# Patient Record
Sex: MALE | Race: WHITE | NOT HISPANIC OR LATINO
[De-identification: names, ages, dates, MRNs, and addresses within clinical notes are randomized per-mention and may not be internally consistent; named-entity substitution may affect disease eponyms.]

---

## 2021-05-04 PROBLEM — Z00.00 ENCOUNTER FOR PREVENTIVE HEALTH EXAMINATION: Status: ACTIVE | Noted: 2021-05-04

## 2021-05-07 ENCOUNTER — APPOINTMENT (OUTPATIENT)
Dept: NEPHROLOGY | Facility: CLINIC | Age: 81
End: 2021-05-07
Payer: MEDICARE

## 2021-05-07 VITALS
DIASTOLIC BLOOD PRESSURE: 70 MMHG | HEART RATE: 93 BPM | BODY MASS INDEX: 23.43 KG/M2 | HEIGHT: 72 IN | SYSTOLIC BLOOD PRESSURE: 114 MMHG | WEIGHT: 173 LBS | RESPIRATION RATE: 12 BRPM | OXYGEN SATURATION: 94 %

## 2021-05-07 PROCEDURE — 99205 OFFICE O/P NEW HI 60 MIN: CPT

## 2021-05-07 NOTE — PHYSICAL EXAM
[General Appearance - Alert] : alert [General Appearance - In No Acute Distress] : in no acute distress [PERRL With Normal Accommodation] : pupils were equal in size, round, and reactive to light [Hearing Threshold Finger Rub Not [place]] : hearing was normal [Auscultation Breath Sounds / Voice Sounds] : lungs were clear to auscultation bilaterally [FreeTextEntry1] : b/l chronic stasis dermatitis, LE edema [Abdomen Soft] : soft [Abdomen Tenderness] : non-tender [No CVA Tenderness] : no ~M costovertebral angle tenderness [] : no rash [No Focal Deficits] : no focal deficits [Affect] : the affect was normal [Mood] : the mood was normal

## 2021-05-07 NOTE — HISTORY OF PRESENT ILLNESS
[FreeTextEntry1] : 80 year old man with PMH of COPD, ex-smoker, stage IV lung cancer diagnosed 5 years ago, s/p chemotherapy and immunotherapy(last cycle in June 2021)- follows with Dr. Heart\par Prostate Ca diagnosed in 2005, s/p radioactive seed placement, s/p brachytherapy ablation in 2015 and now on leupron injections\par Pt reports he was sent by Dr Heart for Creatinine of 1.8\par \par Pt seen and examined\par Feels well\par No acute complaint\par \par Takes Ibuprofen once every few weeks\par \par

## 2021-05-07 NOTE — ASSESSMENT
[FreeTextEntry1] : Marcio vs Marcio on ckd\par Lung Ca stage IV\par prostate Ca\par BPH\par \par Obtain records\par futher labs as needed\par \par RTC in 1 month

## 2021-06-16 ENCOUNTER — APPOINTMENT (OUTPATIENT)
Dept: NEPHROLOGY | Facility: CLINIC | Age: 81
End: 2021-06-16
Payer: MEDICARE

## 2021-06-16 VITALS
HEART RATE: 95 BPM | OXYGEN SATURATION: 93 % | WEIGHT: 174 LBS | DIASTOLIC BLOOD PRESSURE: 70 MMHG | BODY MASS INDEX: 23.57 KG/M2 | HEIGHT: 72 IN | RESPIRATION RATE: 12 BRPM | SYSTOLIC BLOOD PRESSURE: 110 MMHG

## 2021-06-16 PROCEDURE — 99215 OFFICE O/P EST HI 40 MIN: CPT

## 2021-06-28 NOTE — PHYSICAL EXAM
[General Appearance - Alert] : alert [General Appearance - In No Acute Distress] : in no acute distress [PERRL With Normal Accommodation] : pupils were equal in size, round, and reactive to light [Hearing Threshold Finger Rub Not [place]] : hearing was normal [Auscultation Breath Sounds / Voice Sounds] : lungs were clear to auscultation bilaterally [Abdomen Soft] : soft [Abdomen Tenderness] : non-tender [No CVA Tenderness] : no ~M costovertebral angle tenderness [] : no rash [No Focal Deficits] : no focal deficits [Affect] : the affect was normal [Mood] : the mood was normal [FreeTextEntry1] : b/l chronic stasis dermatitis, LE edema

## 2021-06-28 NOTE — HISTORY OF PRESENT ILLNESS
[FreeTextEntry1] : \par 80 -year-old man with history of prostate cancer (dx in 2005- s/p radioactive seed placement, s/p brachytherapy ablation in 2015) now  on leupron, stage IV Lung cancer  dx 5 years ago with metastases to the cervical LN , PDL 1 negative with bulky bilateral mediastinal lymphadenopathy, biopsy proven right SCV lymph node and suspicious bilateral pulmonary nodules, started Carboplatin/ Pemetrexed 04/2/18 on Pem maintenance complicated by lower extremity edema and worsening kidney disease. started Nivolumab  02/8/20 with marked response, treatment held since April 2020 due to  Bursitis, Marcio, toenail infection, requiring vascular workup.\par  \par Pt  sent by Dr Heart for elevated serum Creatinine of 1.8\par \par Pt seen and examined\par Feels well\par No acute complaint\par \par Takes Ibuprofen once every few weeks\par \par

## 2021-06-28 NOTE — ASSESSMENT
[FreeTextEntry1] :  Marcio on ckd\par Lung Ca stage IV- off chemo/ immunotherapy- disease stable\par Prostate Ca\par BPH\par \par Scr worsened post chemotherapy to 1.8;  now fluctuating between 1.8-2.0.\par Obtain UA, TP/cr ratio\par Renal US\par CKD labs\par \par HbA1c 5.7; d/c Metformin\par \par RTC in 1 month

## 2021-08-25 ENCOUNTER — APPOINTMENT (OUTPATIENT)
Dept: NEPHROLOGY | Facility: CLINIC | Age: 81
End: 2021-08-25
Payer: MEDICARE

## 2021-08-25 VITALS
OXYGEN SATURATION: 96 % | SYSTOLIC BLOOD PRESSURE: 124 MMHG | HEIGHT: 72 IN | BODY MASS INDEX: 25.6 KG/M2 | DIASTOLIC BLOOD PRESSURE: 62 MMHG | WEIGHT: 189 LBS | HEART RATE: 78 BPM

## 2021-08-25 PROCEDURE — 99215 OFFICE O/P EST HI 40 MIN: CPT

## 2021-08-25 RX ORDER — TRIAMTERENE AND HYDROCHLOROTHIAZIDE 37.5; 25 MG/1; MG/1
37.5-25 CAPSULE ORAL
Qty: 90 | Refills: 3 | Status: ACTIVE | COMMUNITY
Start: 2021-08-25

## 2021-08-25 RX ORDER — TAMSULOSIN HYDROCHLORIDE 0.4 MG/1
0.4 CAPSULE ORAL
Qty: 30 | Refills: 0 | Status: ACTIVE | COMMUNITY
Start: 2021-08-25

## 2021-08-25 NOTE — PHYSICAL EXAM
[General Appearance - Alert] : alert [General Appearance - In No Acute Distress] : in no acute distress [PERRL With Normal Accommodation] : pupils were equal in size, round, and reactive to light [Hearing Threshold Finger Rub Not [place]] : hearing was normal [Auscultation Breath Sounds / Voice Sounds] : lungs were clear to auscultation bilaterally [Abdomen Soft] : soft [Abdomen Tenderness] : non-tender [No CVA Tenderness] : no ~M costovertebral angle tenderness [] : no rash [No Focal Deficits] : no focal deficits [Affect] : the affect was normal [Mood] : the mood was normal [FreeTextEntry1] : b/l chronic stasis dermatitis, LE edema

## 2021-08-25 NOTE — ASSESSMENT
[FreeTextEntry1] :  Marcio on CKD stage III\par Lung Ca stage IV- off chemo/ immunotherapy- disease stable\par Prostate Ca\par BPH- on Flomax\par DM- \par \par Scr worsened post chemotherapy to 1.8;  now fluctuating between 1.8-2.0.\par LAst Scr stable at 2.06; he has been off Metformin. \par HbA1c worsened from 5.7-->6.1; ok to start Farxiga and monitor GFR.\par UA bland, no proteinuria\par Obtain Renal US\par Avoid NSAIDs\par \par RTC in 3 months

## 2021-08-25 NOTE — HISTORY OF PRESENT ILLNESS
[FreeTextEntry1] : \par 80 -year-old man with history of prostate cancer (dx in 2005- s/p radioactive seed placement, s/p brachytherapy ablation in 2015) now on leupron, stage IV Lung cancer  dx 5 years ago with metastases to the cervical LN , PDL 1 negative with bulky bilateral mediastinal lymphadenopathy, biopsy proven right SCV lymph node and suspicious bilateral pulmonary nodules, started Carboplatin/ Pemetrexed 04/2/18 on Pem maintenance complicated by lower extremity edema and worsening kidney disease. started Nivolumab  02/8/20 with marked response, treatment held since April 2020 due to  Bursitis, Marcio, toenail infection, requiring vascular workup.\par Pt  sent by Dr Heart for elevated serum Creatinine of 1.8.\par \par Pt presents for follow up visit today.\par \par Pt seen and examined\par Feels well\par No acute complaint\par \par Pt gets surveillance CT scans every 12 weeks- next study scheduled in October.\par \par Metformin was d/c;ed last visit; asking if he can ask Farxiga.\par \par \par

## 2022-01-05 ENCOUNTER — APPOINTMENT (OUTPATIENT)
Dept: NEPHROLOGY | Facility: CLINIC | Age: 82
End: 2022-01-05
Payer: MEDICARE

## 2022-01-05 ENCOUNTER — NON-APPOINTMENT (OUTPATIENT)
Age: 82
End: 2022-01-05

## 2022-01-05 VITALS
SYSTOLIC BLOOD PRESSURE: 122 MMHG | WEIGHT: 187 LBS | HEIGHT: 72 IN | HEART RATE: 83 BPM | BODY MASS INDEX: 25.33 KG/M2 | DIASTOLIC BLOOD PRESSURE: 60 MMHG | OXYGEN SATURATION: 98 %

## 2022-01-05 PROCEDURE — 99214 OFFICE O/P EST MOD 30 MIN: CPT

## 2022-01-06 NOTE — PHYSICAL EXAM
[General Appearance - Alert] : alert [General Appearance - In No Acute Distress] : in no acute distress [PERRL With Normal Accommodation] : pupils were equal in size, round, and reactive to light [Hearing Threshold Finger Rub Not [place]] : hearing was normal [Auscultation Breath Sounds / Voice Sounds] : lungs were clear to auscultation bilaterally [Abdomen Soft] : soft [Abdomen Tenderness] : non-tender [No CVA Tenderness] : no ~M costovertebral angle tenderness [] : no rash [No Focal Deficits] : no focal deficits [Affect] : the affect was normal [Mood] : the mood was normal [FreeTextEntry1] : b/l chronic stasis dermatitis, LE edema

## 2022-01-06 NOTE — HISTORY OF PRESENT ILLNESS
[FreeTextEntry1] : \par 81 -year-old man with history of prostate cancer (dx in 2005- s/p radioactive seed placement, s/p brachytherapy ablation in 2015) now on leupron, stage IV Lung cancer  dx 5 years ago with metastases to the cervical LN , PDL 1 negative with bulky bilateral mediastinal lymphadenopathy, biopsy proven right SCV lymph node and suspicious bilateral pulmonary nodules, started Carboplatin/ Pemetrexed 04/2/18 on Pem maintenance complicated by lower extremity edema and worsening kidney disease. started Nivolumab  02/8/20 with marked response, treatment held since April 2020 due to  Bursitis, Marcio, toenail infection, requiring vascular workup.\par Pt  sent by Dr Heart for elevated serum Creatinine of 1.8.\par \par Pt presents for follow up visit today.\par \par Pt seen and examined\par Feels well\par No acute complaint- denies interval illness / hospitalization. \par Pt gets surveillance CT scans every 12 weeks-last one in October was stable.\par \par Pt had labs done in November- Scr 2.1\par He was started on Farxiga a week after- reports he had repeat labs done at [facility] 10 days ago.\par Results requested.\par \par \par \par \par \par

## 2022-01-06 NOTE — ASSESSMENT
[FreeTextEntry1] : CKD stage IV\par Lung Ca stage IV- off chemo/ immunotherapy- disease stable\par Prostate Ca\par BPH- on Flomax\par DM- hba1c 6.1\par \par Scr worsened post chemotherapy to 1.8;  now fluctuating between 1.8-2.0.\par Latest Scr with slight increase to 2.5 <- 2.1 Likely in setting of Farxiga use\par  \par off Metformin,  on Farxiga 5 mg \par Will closely monitor GFR to ensure stability\par \par UA bland, no proteinuria\par Obtain Renal US\par Avoid NSAIDs\par \par vitamin D insuff; maintain on Vit D 2000 IU daily\par \par RTC in 3 months

## 2022-03-31 ENCOUNTER — APPOINTMENT (OUTPATIENT)
Dept: NEPHROLOGY | Facility: CLINIC | Age: 82
End: 2022-03-31
Payer: MEDICARE

## 2022-03-31 VITALS
OXYGEN SATURATION: 94 % | DIASTOLIC BLOOD PRESSURE: 62 MMHG | HEART RATE: 94 BPM | SYSTOLIC BLOOD PRESSURE: 112 MMHG | WEIGHT: 195 LBS | BODY MASS INDEX: 26.41 KG/M2 | HEIGHT: 72 IN

## 2022-03-31 PROCEDURE — 99215 OFFICE O/P EST HI 40 MIN: CPT

## 2022-03-31 NOTE — PHYSICAL EXAM
[General Appearance - Alert] : alert [General Appearance - In No Acute Distress] : in no acute distress [PERRL With Normal Accommodation] : pupils were equal in size, round, and reactive to light [Hearing Threshold Finger Rub Not [place]] : hearing was normal [Auscultation Breath Sounds / Voice Sounds] : lungs were clear to auscultation bilaterally [Abdomen Soft] : soft [Abdomen Tenderness] : non-tender [No CVA Tenderness] : no ~M costovertebral angle tenderness [] : no rash [No Focal Deficits] : no focal deficits [Affect] : the affect was normal [Mood] : the mood was normal [FreeTextEntry1] : b/l chronic stasis dermatitis, LE edema

## 2022-03-31 NOTE — HISTORY OF PRESENT ILLNESS
[FreeTextEntry1] : \par 81 -year-old man with history of prostate cancer (dx in 2005- s/p radioactive seed placement, s/p brachytherapy ablation in 2015) now on leupron, stage IV Lung cancer  dx 5 years ago with metastases to the cervical LN , PDL 1 negative with bulky bilateral mediastinal lymphadenopathy, biopsy proven right SCV lymph node and suspicious bilateral pulmonary nodules, started Carboplatin/ Pemetrexed 04/2/18 on Pem maintenance complicated by lower extremity edema and worsening kidney disease. started Nivolumab  02/8/20 with marked response, treatment held since April 2020 due to  Bursitis, Marcio, toenail infection, requiring vascular workup.\par Pt  sent by Dr Heart for elevated serum Creatinine of 1.8.\par \par Pt presents for follow up visit today for worsening Scr.\par \par Pt seen and examined\par Feels well\par No acute complaint- denies interval illness / hospitalization. \par Pt gets surveillance CT scans every 12 weeks-last one in April was stable.\par he was started on Farxiga in November, continues to be on 5 mg daily\par has labs done at [facility] last week- results requested.\par \par \par \par \par \par \par

## 2022-03-31 NOTE — ASSESSMENT
[FreeTextEntry1] : Marcio on CKD stage IV\par Lung Ca stage IV- off chemo/ immunotherapy- disease stable\par Prostate Cancer on Leupron\par BPH- on Flomax\par DM- hba1c 6.5\par \par Scr worsened post chemotherapy to 1.8 and then fluctuated between 1.8-2.0\par Scr worsened to 2.5<-2.1i n setting of Farxiga use\par Recent labs with worsening Scr to 2.8, GFR 22 ml/min\par will d/c Farxiga- he remains off metformin. Glycemic control is not too poor (Hba1c 6.5)\par Aim for diet control for DM\par  he is scheduled to repeat labs at [facility] mid of april- will follow\par \par UA bland, no proteinuria\par Obtain Renal US\par Avoid NSAIDs\par \par vitamin D insuff; maintain on Vit D 2000 IU daily\par \par RTC in 3 months

## 2022-05-04 ENCOUNTER — APPOINTMENT (OUTPATIENT)
Dept: NEPHROLOGY | Facility: CLINIC | Age: 82
End: 2022-05-04

## 2022-06-22 ENCOUNTER — OUTPATIENT (OUTPATIENT)
Dept: OUTPATIENT SERVICES | Facility: HOSPITAL | Age: 82
LOS: 1 days | End: 2022-06-22
Payer: MEDICARE

## 2022-06-22 ENCOUNTER — APPOINTMENT (OUTPATIENT)
Dept: ULTRASOUND IMAGING | Facility: CLINIC | Age: 82
End: 2022-06-22
Payer: MEDICARE

## 2022-06-22 DIAGNOSIS — N17.9 ACUTE KIDNEY FAILURE, UNSPECIFIED: ICD-10-CM

## 2022-06-22 PROCEDURE — 76775 US EXAM ABDO BACK WALL LIM: CPT | Mod: 26

## 2022-06-22 PROCEDURE — 76775 US EXAM ABDO BACK WALL LIM: CPT

## 2022-06-29 RX ORDER — DAPAGLIFLOZIN 5 MG/1
5 TABLET, FILM COATED ORAL DAILY
Qty: 30 | Refills: 3 | Status: DISCONTINUED | COMMUNITY
Start: 2022-03-31 | End: 2022-06-29

## 2022-06-30 ENCOUNTER — APPOINTMENT (OUTPATIENT)
Dept: NEPHROLOGY | Facility: CLINIC | Age: 82
End: 2022-06-30

## 2022-06-30 VITALS
DIASTOLIC BLOOD PRESSURE: 64 MMHG | OXYGEN SATURATION: 97 % | BODY MASS INDEX: 25.73 KG/M2 | HEART RATE: 92 BPM | HEIGHT: 72 IN | SYSTOLIC BLOOD PRESSURE: 122 MMHG | WEIGHT: 190 LBS

## 2022-06-30 DIAGNOSIS — E79.0 HYPERURICEMIA W/OUT SIGNS OF INFLAMMATORY ARTHRITIS AND TOPHACEOUS DISEASE: ICD-10-CM

## 2022-06-30 PROCEDURE — 36415 COLL VENOUS BLD VENIPUNCTURE: CPT

## 2022-06-30 PROCEDURE — 99214 OFFICE O/P EST MOD 30 MIN: CPT | Mod: 25

## 2022-06-30 RX ORDER — ADHESIVE TAPE 3"X 2.3 YD
50 MCG TAPE, NON-MEDICATED TOPICAL
Refills: 0 | Status: ACTIVE | COMMUNITY
Start: 2022-06-30

## 2022-07-01 PROBLEM — E79.0 ELEVATED BLOOD URIC ACID LEVEL: Status: ACTIVE | Noted: 2022-07-01

## 2022-07-01 LAB
25(OH)D3 SERPL-MCNC: 52.9 NG/ML
ALBUMIN SERPL ELPH-MCNC: 4.1 G/DL
ANION GAP SERPL CALC-SCNC: 15 MMOL/L
BUN SERPL-MCNC: 40 MG/DL
CALCIUM SERPL-MCNC: 9.3 MG/DL
CALCIUM SERPL-MCNC: 9.3 MG/DL
CHLORIDE SERPL-SCNC: 97 MMOL/L
CO2 SERPL-SCNC: 24 MMOL/L
CREAT SERPL-MCNC: 3.22 MG/DL
EGFR: 19 ML/MIN/1.73M2
ESTIMATED AVERAGE GLUCOSE: 146 MG/DL
GLUCOSE SERPL-MCNC: 147 MG/DL
HBA1C MFR BLD HPLC: 6.7 %
MAGNESIUM SERPL-MCNC: 2.2 MG/DL
PARATHYROID HORMONE INTACT: 61 PG/ML
PHOSPHATE SERPL-MCNC: 4 MG/DL
POTASSIUM SERPL-SCNC: 4.3 MMOL/L
SODIUM SERPL-SCNC: 136 MMOL/L
URATE SERPL-MCNC: 9 MG/DL

## 2022-07-01 NOTE — ASSESSMENT
[FreeTextEntry1] : CKD stage IV\par Lung Ca stage IV- off chemo/ immunotherapy- disease stable; Prostate Cancer on Leupron\par Scr worsened post chemotherapy to 1.8 and then fluctuated between 1.8-2.0\par BPH- on Flomax\par Scr worsened  in setting of Farxiga (SGLT2) use\par Labs from 4/2022 reviewed: \par SCr 2.8-->2.9 ; eGFR 21 - stable CKD IV\par \par Hgb level acceptable at 12.1\par Will obtain renal panel, vit D 25-OH, iPTH, uric acid and magnesium levels today\par \par DM- previus hba1c 6.5\par No longer on Farxiga or Metformin\par will obtain new hgb A1c\par \par -Prior UA bland, no proteinuria\par - Renal U/S reviewed- normal\par -Avoid NSAIDs\par \par vitamin D insuff\par  maintain on Vit D 2000 IU daily\par \par RTO in 2 months\par \par Plan of care reviewed with supervising physician, Dr. Lul Cody\par

## 2022-07-01 NOTE — HISTORY OF PRESENT ILLNESS
[Stage 4] : stage 4 [FreeTextEntry1] : \par Last visit with Nephrologist, Dr. Lul Cody, 3/31/22\par \par 81 -year-old man with history of prostate cancer (dx in 2005- s/p radioactive seed placement, s/p brachytherapy ablation in 2015) now on leupron, COPD, stage IV Lung cancer dx 5 years ago with metastases to the cervical LN , PDL 1 negative with bulky bilateral mediastinal lymphadenopathy, biopsy proven right SCV lymph node and suspicious bilateral pulmonary nodules, started Carboplatin/ Pemetrexed 04/2/18 on Pem maintenance complicated by lower extremity edema and worsening kidney disease. started Nivolumab 02/8/20 with marked response, treatment held since April 2020 due to Bursitis, Marcio, toenail infection, requiring vascular workup.\par \par Pt sent by Dr Heart for elevated serum Creatinine of 1.8.\par Pt presents for follow up visit today for worsening Scr.\par Pt seen and examined\par Feels well\par No acute complaint- denies interval illness / hospitalization. \par Pt gets surveillance CT scans every 12 weeks-last one in April was stable.\par he was started on Farxiga in November, continues to be on 5 mg daily\par has labs done at [facility] last week- results requested.\par \par CURRENT:\par Patient seen and examined today, states he feels well. He follows with pulmonology (Dr. Jose Gonzalez) regularly. He denies recent medication changes or acute illnesses. Blood pressure is well controlled. pt states that his legs always have some mild swelling but that swelling has gone down. Patient denies difficulty voiding and denies complaints or concerns today. He states that he is taking "Atorvastatin" medication but cannot remember dose.\par \par \par

## 2022-07-01 NOTE — REVIEW OF SYSTEMS
[Shortness Of Breath] : shortness of breath [Negative] : Heme/Lymph [Wheezing] : no wheezing [Cough] : no cough [FreeTextEntry6] : follows w/ pulmonology [de-identified] : erythematous skin areas noted on b/l shins

## 2022-07-01 NOTE — PHYSICAL EXAM
[General Appearance - Alert] : alert [Sclera] : the sclera and conjunctiva were normal [Outer Ear] : the ears and nose were normal in appearance [Neck Appearance] : the appearance of the neck was normal [Jugular Venous Distention Increased] : there was no jugular-venous distention [] : no respiratory distress [Exaggerated Use Of Accessory Muscles For Inspiration] : no accessory muscle use [Auscultation Breath Sounds / Voice Sounds] : lungs were clear to auscultation bilaterally [Apical Impulse] : the apical impulse was normal [Heart Sounds] : normal S1 and S2 [Murmurs] : no murmurs [Heart Sounds Pericardial Friction Rub] : no pericardial rub [Bowel Sounds] : normal bowel sounds [Abdomen Soft] : soft [Abdomen Tenderness] : non-tender [Involuntary Movements] : no involuntary movements were seen [Oriented To Time, Place, And Person] : oriented to person, place, and time [Affect] : the affect was normal [Mood] : the mood was normal [FreeTextEntry1] : +erythematous areas noted on b/l shins

## 2022-07-01 NOTE — ADDENDUM
[FreeTextEntry1] : Spoke with patient on telephone and discussed new lab results\par SCr 2.9--> 3.2\par Elevated uric acid level; 9.0 (Goal uric acid level <7.0 for CKD patients)\par Will start patient on Allopurinol 50mg every other day and monitor serum uric acid level\par Maintain adequate daily hydration\par \par Patient has next office visit scheduled for 8/18/22 with Dr. Lul Cody\par

## 2022-08-31 ENCOUNTER — APPOINTMENT (OUTPATIENT)
Dept: NEPHROLOGY | Facility: CLINIC | Age: 82
End: 2022-08-31

## 2022-08-31 VITALS
OXYGEN SATURATION: 95 % | WEIGHT: 196.5 LBS | SYSTOLIC BLOOD PRESSURE: 108 MMHG | HEIGHT: 72 IN | DIASTOLIC BLOOD PRESSURE: 52 MMHG | BODY MASS INDEX: 26.61 KG/M2 | HEART RATE: 64 BPM

## 2022-08-31 PROCEDURE — 36415 COLL VENOUS BLD VENIPUNCTURE: CPT

## 2022-08-31 PROCEDURE — 99214 OFFICE O/P EST MOD 30 MIN: CPT | Mod: 25

## 2022-08-31 RX ORDER — NIACIN 500 MG
500 TABLET, EXTENDED RELEASE ORAL
Refills: 0 | Status: ACTIVE | COMMUNITY

## 2022-08-31 NOTE — HISTORY OF PRESENT ILLNESS
[FreeTextEntry1] : Patient reports no health related adverse event since last clinic visit. \par NO ER/Hospitalization/urgent care visit. \par Denies any cardiac or urinary complaints. Continues to have b/L LE swelling but patient says that it is at baseline.\par C/o itching/allergy around eyes\par No dysuria, gross hematuria, SOB, LUTS.\par Reports BP has been well controlled. \par \par \par

## 2022-08-31 NOTE — ASSESSMENT
[FreeTextEntry1] : Chronic Kidney Disease Stage IV w/ progression\par VS \par NIYAH on CKD 3 (less likely)\par \par ETIO\par likely multifactorial 2/2 chemotherapy related and aging nephrosclerosis, with +/- renal atherosclerotic disease.\par \par DATA 30 Jun 22\par SCr:  3.2mg/dl for an eGFR 19, slowly progressing\par Proteinuria: not available\par \par GOALS\par Discussed goal HTN < 140/90, LDL <100\par Achieving  goals\par For HTN: controlled on current medications\par For hyperlipidemia: controlled on current medications\par \par COMPLICATIONS OF CKD:\par BMD w/ CKD: PTH 61 within range for his level of CKD, Vit D WNL.\par Anemia w/ CKD: no labs available\par Edema: +, patient reports it at baseline since immunotherapy and ankle fracture\par \par RECOMMENDATIONS: \par 1. To continue on current dose of triamterene -HCTZ . His BP is too tightly controlled I discussed option of change in medications and diuretics. patient not in favor of changing chronic medications that has kept things stable for him. \par 2. To continue on current dose of atorvastatin. F/U lipid panel from PCP office.\par 3. To continue on current dose of flomax\par 4. Will check UA, UPCR, CMP, iron anemia lab panel, vit b12 and folate, uric acid levels.\par \par If active sediment on UA will consider adding immunology/serology w/u.\par Patient was made aware of progressive worsening of eGFR and brief TOPS education if things continue to worsen was provided. \par \par He should follow up with Dr. Cody/me in 1-1.5 months.\par I have spent a total of 45 minutes in which > 50% was spent in discussion with patient regarding CKD, TOPS, HTN, edema management. \par \par \par \par

## 2022-09-02 ENCOUNTER — NON-APPOINTMENT (OUTPATIENT)
Age: 82
End: 2022-09-02

## 2022-09-02 LAB
25(OH)D3 SERPL-MCNC: 47.5 NG/ML
ALBUMIN SERPL ELPH-MCNC: 3.7 G/DL
ALP BLD-CCNC: 129 U/L
ALT SERPL-CCNC: 14 U/L
ANION GAP SERPL CALC-SCNC: 20 MMOL/L
AST SERPL-CCNC: 13 U/L
BASOPHILS # BLD AUTO: 0.06 K/UL
BASOPHILS NFR BLD AUTO: 0.8 %
BILIRUB SERPL-MCNC: 0.3 MG/DL
BUN SERPL-MCNC: 38 MG/DL
CALCIUM SERPL-MCNC: 9.1 MG/DL
CALCIUM SERPL-MCNC: 9.1 MG/DL
CHLORIDE SERPL-SCNC: 96 MMOL/L
CO2 SERPL-SCNC: 21 MMOL/L
CREAT SERPL-MCNC: 3.04 MG/DL
EGFR: 20 ML/MIN/1.73M2
EOSINOPHIL # BLD AUTO: 0.33 K/UL
EOSINOPHIL NFR BLD AUTO: 4.7 %
FERRITIN SERPL-MCNC: 411 NG/ML
FOLATE SERPL-MCNC: 7.7 NG/ML
GLUCOSE SERPL-MCNC: 216 MG/DL
HCT VFR BLD CALC: 35.6 %
HGB BLD-MCNC: 10.7 G/DL
IMM GRANULOCYTES NFR BLD AUTO: 0.4 %
IRON SATN MFR SERPL: 14 %
IRON SERPL-MCNC: 41 UG/DL
LYMPHOCYTES # BLD AUTO: 1.63 K/UL
LYMPHOCYTES NFR BLD AUTO: 23 %
MAN DIFF?: NORMAL
MCHC RBC-ENTMCNC: 30.1 GM/DL
MCHC RBC-ENTMCNC: 30.2 PG
MCV RBC AUTO: 100.6 FL
MONOCYTES # BLD AUTO: 0.51 K/UL
MONOCYTES NFR BLD AUTO: 7.2 %
NEUTROPHILS # BLD AUTO: 4.52 K/UL
NEUTROPHILS NFR BLD AUTO: 63.9 %
PARATHYROID HORMONE INTACT: 71 PG/ML
PLATELET # BLD AUTO: 333 K/UL
POTASSIUM SERPL-SCNC: 4.6 MMOL/L
PROT SERPL-MCNC: 6.6 G/DL
RBC # BLD: 3.54 M/UL
RBC # FLD: 14.1 %
SODIUM SERPL-SCNC: 136 MMOL/L
TIBC SERPL-MCNC: 286 UG/DL
UIBC SERPL-MCNC: 244 UG/DL
URATE SERPL-MCNC: 7.8 MG/DL
VIT B12 SERPL-MCNC: 314 PG/ML
WBC # FLD AUTO: 7.08 K/UL

## 2022-10-12 ENCOUNTER — APPOINTMENT (OUTPATIENT)
Dept: NEPHROLOGY | Facility: CLINIC | Age: 82
End: 2022-10-12

## 2022-10-12 ENCOUNTER — NON-APPOINTMENT (OUTPATIENT)
Age: 82
End: 2022-10-12

## 2022-10-12 VITALS
WEIGHT: 199 LBS | SYSTOLIC BLOOD PRESSURE: 102 MMHG | HEART RATE: 78 BPM | OXYGEN SATURATION: 92 % | DIASTOLIC BLOOD PRESSURE: 72 MMHG | HEIGHT: 72 IN | BODY MASS INDEX: 26.95 KG/M2

## 2022-10-12 DIAGNOSIS — N18.4 CHRONIC KIDNEY DISEASE, STAGE 4 (SEVERE): ICD-10-CM

## 2022-10-12 DIAGNOSIS — C34.90 MALIGNANT NEOPLASM OF UNSPECIFIED PART OF UNSPECIFIED BRONCHUS OR LUNG: ICD-10-CM

## 2022-10-12 DIAGNOSIS — E11.9 TYPE 2 DIABETES MELLITUS W/OUT COMPLICATIONS: ICD-10-CM

## 2022-10-12 DIAGNOSIS — N17.9 ACUTE KIDNEY FAILURE, UNSPECIFIED: ICD-10-CM

## 2022-10-12 DIAGNOSIS — D63.1 CHRONIC KIDNEY DISEASE, UNSPECIFIED: ICD-10-CM

## 2022-10-12 DIAGNOSIS — I10 ESSENTIAL (PRIMARY) HYPERTENSION: ICD-10-CM

## 2022-10-12 DIAGNOSIS — N18.9 CHRONIC KIDNEY DISEASE, UNSPECIFIED: ICD-10-CM

## 2022-10-12 PROCEDURE — 99215 OFFICE O/P EST HI 40 MIN: CPT | Mod: 25

## 2022-10-12 PROCEDURE — 36415 COLL VENOUS BLD VENIPUNCTURE: CPT

## 2022-10-12 RX ORDER — ALLOPURINOL 100 MG/1
100 TABLET ORAL
Qty: 20 | Refills: 3 | Status: ACTIVE | COMMUNITY
Start: 2022-07-01 | End: 1900-01-01

## 2022-10-13 ENCOUNTER — NON-APPOINTMENT (OUTPATIENT)
Age: 82
End: 2022-10-13

## 2022-10-13 LAB
ALBUMIN SERPL ELPH-MCNC: 4 G/DL
ANION GAP SERPL CALC-SCNC: 16 MMOL/L
BUN SERPL-MCNC: 36 MG/DL
CALCIUM SERPL-MCNC: 8.8 MG/DL
CHLORIDE SERPL-SCNC: 98 MMOL/L
CO2 SERPL-SCNC: 21 MMOL/L
CREAT SERPL-MCNC: 2.58 MG/DL
EGFR: 24 ML/MIN/1.73M2
GLUCOSE SERPL-MCNC: 205 MG/DL
PHOSPHATE SERPL-MCNC: 4.1 MG/DL
POTASSIUM SERPL-SCNC: 4.3 MMOL/L
SODIUM SERPL-SCNC: 135 MMOL/L

## 2022-10-13 NOTE — PHYSICAL EXAM
[General Appearance - Alert] : alert [Sclera] : the sclera and conjunctiva were normal [Outer Ear] : the ears and nose were normal in appearance [Neck Appearance] : the appearance of the neck was normal [Jugular Venous Distention Increased] : there was no jugular-venous distention [] : no respiratory distress [Exaggerated Use Of Accessory Muscles For Inspiration] : no accessory muscle use [Auscultation Breath Sounds / Voice Sounds] : lungs were clear to auscultation bilaterally [Apical Impulse] : the apical impulse was normal [Heart Sounds] : normal S1 and S2 [Murmurs] : no murmurs [Heart Sounds Pericardial Friction Rub] : no pericardial rub [Bowel Sounds] : normal bowel sounds [Abdomen Soft] : soft [Abdomen Tenderness] : non-tender [Involuntary Movements] : no involuntary movements were seen [Oriented To Time, Place, And Person] : oriented to person, place, and time [Affect] : the affect was normal [Mood] : the mood was normal [No Focal Deficits] : no focal deficits [FreeTextEntry1] : +erythematous areas noted on b/l shins

## 2022-10-13 NOTE — HISTORY OF PRESENT ILLNESS
[Stage 4] : stage 4 [FreeTextEntry1] : \par 81 -year-old man with history of prostate cancer (dx in 2005- s/p radioactive seed placement, s/p brachytherapy ablation in 2015) now on leupron, COPD, stage IV Lung cancer dx 5 years ago with metastases to the cervical LN , PDL 1 negative with bulky bilateral mediastinal lymphadenopathy, biopsy proven right SCV lymph node and suspicious bilateral pulmonary nodules, started Carboplatin/ Pemetrexed 04/2/18 on Pem maintenance complicated by lower extremity edema and worsening kidney disease. started Nivolumab 02/8/20 with marked response, treatment held since April 2020 due to Bursitis, Marcio, toenail infection, requiring vascular workup.\par \par \par Pt presents for follow up visit today for Marcio vs marcio on ckd.\par Pt seen and examined\par Feels well\par No acute complaint- denies interval illness / hospitalization. \par he has been off Farxiga and Metformin\par Pt gets surveillance CT scans every 12 weeks-stable\par [place] [facility] [place]\par \par \par

## 2022-10-13 NOTE — REVIEW OF SYSTEMS
[Shortness Of Breath] : shortness of breath [Negative] : Heme/Lymph [Wheezing] : no wheezing [Cough] : no cough [FreeTextEntry6] : follows w/ pulmonology [de-identified] : erythematous skin areas noted on b/l shins

## 2022-10-13 NOTE — ASSESSMENT
[FreeTextEntry1] : Marcio on CKD stage IV vs progression of CKD\par Lung Ca stage IV- off chemo/ immunotherapy- disease stable; Prostate Cancer on Leupron\par Scr worsened post chemotherapy to 1.8 and then fluctuated between 1.8-2.0\par BPH- on Flomax\par \par Scr has been worse at 3.2-> 3.0 since June\par He is off Metformin and Farxiga\par Was not able to give urine sample last visit\par Obtain UA, tpcr ratio\par Serological work up if indicated by UA results\par Repeat renal panel today\par \par BP soft, he has chronic LE edema (unchanged)\par Will d/c Triamterene and send Rx for HCTZ 25 mg daily only to allow better renal perfusion\par \par Anemia of CKD\par Hb at goal\par Low iron stores\par Anemia mgt as per heme-onc\par \par \par DM- HbA1c worse at 6.7\par off  Farxiga and Metformin\par endocrinology eval for DM mgt\par \par RTC in 3 months \par

## 2022-10-19 LAB
APPEARANCE: CLEAR
BACTERIA: NEGATIVE
BILIRUBIN URINE: NEGATIVE
BLOOD URINE: NEGATIVE
COLOR: COLORLESS
CREAT SPEC-SCNC: 33 MG/DL
CREAT/PROT UR: 0.3 RATIO
GLUCOSE QUALITATIVE U: NEGATIVE
HYALINE CASTS: 0 /LPF
KETONES URINE: NEGATIVE
LEUKOCYTE ESTERASE URINE: NEGATIVE
MICROSCOPIC-UA: NORMAL
NITRITE URINE: NEGATIVE
PH URINE: 6
PROT UR-MCNC: 9 MG/DL
PROTEIN URINE: NEGATIVE
RED BLOOD CELLS URINE: 3 /HPF
SPECIFIC GRAVITY URINE: 1.01
SQUAMOUS EPITHELIAL CELLS: 0 /HPF
UROBILINOGEN URINE: NORMAL
WHITE BLOOD CELLS URINE: 0 /HPF

## 2023-01-18 ENCOUNTER — APPOINTMENT (OUTPATIENT)
Dept: NEPHROLOGY | Facility: CLINIC | Age: 83
End: 2023-01-18

## 2023-02-08 ENCOUNTER — RX RENEWAL (OUTPATIENT)
Age: 83
End: 2023-02-08

## 2023-02-08 RX ORDER — HYDROCHLOROTHIAZIDE 25 MG/1
25 TABLET ORAL DAILY
Qty: 30 | Refills: 0 | Status: ACTIVE | COMMUNITY
Start: 2022-10-12 | End: 1900-01-01

## 2024-07-19 ENCOUNTER — TRANSCRIPTION ENCOUNTER (OUTPATIENT)
Age: 84
End: 2024-07-19

## 2024-07-24 ENCOUNTER — TRANSCRIPTION ENCOUNTER (OUTPATIENT)
Age: 84
End: 2024-07-24

## 2024-07-24 ENCOUNTER — APPOINTMENT (OUTPATIENT)
Dept: CARE COORDINATION | Facility: HOME HEALTH | Age: 84
End: 2024-07-24
Payer: MEDICARE

## 2024-07-24 DIAGNOSIS — C34.90 MALIGNANT NEOPLASM OF UNSPECIFIED PART OF UNSPECIFIED BRONCHUS OR LUNG: ICD-10-CM

## 2024-07-24 DIAGNOSIS — J44.9 CHRONIC OBSTRUCTIVE PULMONARY DISEASE, UNSPECIFIED: ICD-10-CM

## 2024-07-24 DIAGNOSIS — Z85.46 PERSONAL HISTORY OF MALIGNANT NEOPLASM OF PROSTATE: ICD-10-CM

## 2024-07-24 DIAGNOSIS — Z86.39 PERSONAL HISTORY OF OTHER ENDOCRINE, NUTRITIONAL AND METABOLIC DISEASE: ICD-10-CM

## 2024-07-24 DIAGNOSIS — F41.9 ANXIETY DISORDER, UNSPECIFIED: ICD-10-CM

## 2024-07-24 PROCEDURE — 99495 TRANSJ CARE MGMT MOD F2F 14D: CPT

## 2024-07-26 VITALS — OXYGEN SATURATION: 83 % | RESPIRATION RATE: 18 BRPM | HEART RATE: 100 BPM

## 2024-07-26 PROBLEM — J44.9 CHRONIC OBSTRUCTIVE PULMONARY DISEASE, UNSPECIFIED COPD TYPE: Status: ACTIVE | Noted: 2024-07-26

## 2024-07-26 PROBLEM — Z85.46 HISTORY OF MALIGNANT NEOPLASM OF PROSTATE: Status: RESOLVED | Noted: 2024-07-26 | Resolved: 2024-07-26

## 2024-07-26 PROBLEM — F41.9 ANXIETY: Status: ACTIVE | Noted: 2024-07-26

## 2024-07-26 PROBLEM — Z86.39 HISTORY OF HYPOTHYROIDISM: Status: RESOLVED | Noted: 2024-07-26 | Resolved: 2024-07-26

## 2024-07-26 RX ORDER — FUROSEMIDE 20 MG/1
20 TABLET ORAL
Qty: 30 | Refills: 0 | Status: ACTIVE | COMMUNITY

## 2024-07-26 RX ORDER — IPRATROPIUM BROMIDE AND ALBUTEROL SULFATE 2.5; .5 MG/3ML; MG/3ML
0.5-2.5 (3) SOLUTION RESPIRATORY (INHALATION)
Qty: 1 | Refills: 0 | Status: ACTIVE | COMMUNITY

## 2024-07-26 RX ORDER — MAGNESIUM HYDROXIDE 400 MG/5ML
400 SUSPENSION ORAL
Refills: 0 | Status: ACTIVE | COMMUNITY

## 2024-07-26 RX ORDER — FLUTICASONE PROPIONATE 50 MCG
50 SPRAY, SUSPENSION NASAL DAILY
Refills: 0 | Status: ACTIVE | COMMUNITY

## 2024-07-26 RX ORDER — TIOTROPIUM BROMIDE AND OLODATEROL 3.124; 2.736 UG/1; UG/1
2.5-2.5 SPRAY, METERED RESPIRATORY (INHALATION)
Refills: 0 | Status: ACTIVE | COMMUNITY

## 2024-07-26 RX ORDER — CLONAZEPAM 0.5 MG/1
0.5 TABLET ORAL TWICE DAILY
Refills: 0 | Status: ACTIVE | COMMUNITY

## 2024-07-26 RX ORDER — FAMOTIDINE 20 MG/1
20 TABLET, FILM COATED ORAL DAILY
Refills: 0 | Status: ACTIVE | COMMUNITY

## 2024-07-26 RX ORDER — FOLIC ACID 1 MG/1
1 TABLET ORAL DAILY
Qty: 90 | Refills: 0 | Status: ACTIVE | COMMUNITY

## 2024-07-26 RX ORDER — ATORVASTATIN CALCIUM 20 MG/1
20 TABLET, FILM COATED ORAL
Qty: 30 | Refills: 0 | Status: ACTIVE | COMMUNITY

## 2024-07-26 RX ORDER — CHLORHEXIDINE GLUCONATE 4 %
1000 LIQUID (ML) TOPICAL DAILY
Refills: 0 | Status: ACTIVE | COMMUNITY

## 2024-07-26 RX ORDER — DOCUSATE SODIUM 100 MG/1
100 CAPSULE, LIQUID FILLED ORAL TWICE DAILY
Refills: 0 | Status: ACTIVE | COMMUNITY

## 2024-07-26 RX ORDER — MULTIVITAMIN
TABLET ORAL DAILY
Refills: 0 | Status: ACTIVE | COMMUNITY

## 2024-07-26 NOTE — REVIEW OF SYSTEMS
[Shortness Of Breath] : shortness of breath [Wheezing] : no wheezing [Cough] : cough [Dyspnea on Exertion] : dyspnea on exertion [Joint Pain] : no joint pain [Muscle Pain] : no muscle pain [Muscle Weakness] : muscle weakness [Back Pain] : no back pain [Negative] : Psychiatric

## 2024-07-26 NOTE — HISTORY OF PRESENT ILLNESS
[Post-hospitalization from ___ Hospital] : Post-hospitalization from [unfilled] Hospital [Admitted on: ___] : The patient was admitted on [unfilled] [Discharged on ___] : discharged on [unfilled] [Patient Contacted By: ____] : and contacted by [unfilled] [FreeTextEntry2] : Copied from EMR, "History of Present Illness: 83M brought in from [facility] c/o SOB for 2 days and admitted for management of acute hypoxic respiratory failure in setting of stage IV adenocarcinoma of the lung and symptomatic anemia. Pt has a PMH of prostate cancer, stage IV adenocarcinoma of the lung (Receiving chemotherapy at [facility] every 2 weeks per patient; next dose 7/22), chronic indwelling Colby catheter, CKD, diabetes mellitus, anemia, hypothyroidism, HLD, HTN. Pt described inability to walk approx 10 feet without requiring rest. Pt had prior admission in june s/p paracentesis. During this admission pt found to have Hgb as low as 6 s/p 2 blood transfusions. Pt. denied F/N/V/D/C, chest pain, palpitations, abdominal pain, changes in bm, blood in stool or hematuria, dysuria. Pt denied any recent sick contacts. ED Course Vital signs: Afebrile 98.2, HR 70, RR 21 O2 100% /66 Labs: WBC 3.49, Hgb 6.8 / Hct 21.3 (June 2024 was as low as 6.5 s/p 2 PRBC transfusion) Trops 79, 75 BNP 13K, BUN/Cr 37/2.54 (baseline 1.5-2) EKG: NSR Imaging: CXR B/L lower lobe infiltrates, suggestive for CHF #Acute on chronic respiratory failure with hypoxemia; resolved - in setting of Stage IV Adenocarcinoma of the Lung, receive Chemotherapy every 2 weeks at [facility] - SOB on exertion, unable to walk more than 10 feet, saturating well on 4L of O2 (Home O2 3L) - xray: B/L lower lobe infiltrates suggesting of CHF - Maintain O2 saturation of 92% - C/t Duoneb q6 - Elevated Troponin possible 2/2 to demand ischemia in setting of acute CHF exacerbation; Trops 79, 75 - f/U troponins, downtrending; resolved #Stage IV adenocarcinoma of the lung -Held allopurinol due to worsening renal function -c/w home mirtazapine 15mg qHS for appetite -c/w home famotidine 20 qD for nausea -c/w chemo regimen outpatient #Symptomatic anemia #anemia of chronic disease #acute on chronic symptomatic anemia - Hgb 6.8 / Hct 21.3 (June 2024 was as low as 6.5 needed PRBC transfusion) - s/p 2 units of RBCs and received lasix 20 units after 1 unit of PRBC given in the ED - monitor H/H, transfuse if less than 7 #Acute kidney injury superimposed on CKD #Bilateral lower extremity edema - NIYAH in setting of chronic kidney disease - BUN/Cr 37/2.54 (baseline 1.5-2) - Held home Lisinopril and allopurinol due to presentation of worsening renal function - Avoid Nephrotoxic Medications - Monitored BUN/Creatinine daily - one time Lasix 20mg dose IV 7/17 - I/O's over the past 24 hr's w/ output of 2.15L (7/18) - pt reported B/L edema resolving and at baseline per visual confirmation (7/18) #Diabetes mellitus #hypoglycemia - asymptomatic hypoglycemia (50s) this AM; significantly improved in the afternoon; endorses routine of eating crackers and consuming juice to maintain sugar levels - c/w home Basaglar, decreased to 10 Units due to Diabetic diet in hospital -c/w insulin sliding scale -On chart review, pt's Hba1C was wnl at 4.8 in April of 2024; in the setting of chronic hypoglycemia and reported episodes of symptomatic hypoglycemia per pt hx, recommend that pt's insulin be held at home and pt's intake of high glycemic index products (i.e. crackers and juice) in the mornings and evenings in order to compensate for said hypoglycemia be limited or stopped all together; follow up outpatient with PCP for long term glycemic control and f/u on HbA1C. #HLD (hyperlipidemia) - held home med atorvastatin 20 mg due to worsening renal function #Hypothyroidism - c/w home Levothyroxine #Prostate cancer #BPH - hx of prostate CA; s/p tx - c/w chronic indwelling colby catheter - c/w home flomax #Anxiety - c/w home klonopin Plan: Code: Full code DVT PPx: Held due to low Hgb; continued via SQ Hep on 7/17 Activity: ambulate with assistance Diet: Regular Dispo: [place] per OR clearance"  Pt seen in his nursing home for transitional care management. He is using a portable oxygen device in dining room and desires to eat his lunch. He reports no worsening symptoms.

## 2024-07-26 NOTE — COUNSELING
[Fall prevention counseling provided] : Fall prevention counseling provided [Adequate lighting] : Adequate lighting [No throw rugs] : No throw rugs [Use proper foot wear] : Use proper foot wear [Use recommended devices] : Use recommended devices [Patient Non-adherent to care plan] : Patient non-adherent to care plan [Needs reinforcement, provided] : Patient needs reinforcement on understanding of lifestyle changes and steps needed to achieve self management goal; reinforcement was provided

## 2024-07-26 NOTE — PHYSICAL EXAM
[No Acute Distress] : no acute distress [Well Nourished] : well nourished [Ill-Appearing] : ill-appearing [No Respiratory Distress] : no respiratory distress  [Alert and Oriented x3] : oriented to person, place, and time [de-identified] : pt declined exam [de-identified] : pt declined exam

## 2024-08-30 ENCOUNTER — TRANSCRIPTION ENCOUNTER (OUTPATIENT)
Age: 84
End: 2024-08-30

## 2024-09-02 ENCOUNTER — TRANSCRIPTION ENCOUNTER (OUTPATIENT)
Age: 84
End: 2024-09-02